# Patient Record
Sex: FEMALE | Race: WHITE | HISPANIC OR LATINO | URBAN - METROPOLITAN AREA
[De-identification: names, ages, dates, MRNs, and addresses within clinical notes are randomized per-mention and may not be internally consistent; named-entity substitution may affect disease eponyms.]

---

## 2020-06-10 ENCOUNTER — OFFICE VISIT (OUTPATIENT)
Dept: URBAN - METROPOLITAN AREA SURGERY CENTER 12 | Facility: SURGERY CENTER | Age: 55
End: 2020-06-10

## 2020-06-11 ENCOUNTER — LAB OUTSIDE AN ENCOUNTER (OUTPATIENT)
Dept: URBAN - METROPOLITAN AREA CLINIC 68 | Facility: CLINIC | Age: 55
End: 2020-06-11

## 2020-06-11 LAB — 01: (no result)

## 2020-06-18 ENCOUNTER — TELEPHONE ENCOUNTER (OUTPATIENT)
Dept: URBAN - METROPOLITAN AREA CLINIC 68 | Facility: CLINIC | Age: 55
End: 2020-06-18

## 2022-06-04 ENCOUNTER — TELEPHONE ENCOUNTER (OUTPATIENT)
Dept: URBAN - METROPOLITAN AREA CLINIC 68 | Facility: CLINIC | Age: 57
End: 2022-06-04

## 2022-06-04 RX ORDER — SODIUM SULFATE, POTASSIUM SULFATE, MAGNESIUM SULFATE 17.5; 3.13; 1.6 G/ML; G/ML; G/ML
SOLUTION, CONCENTRATE ORAL AS DIRECTED
Qty: 1 | Refills: 0 | OUTPATIENT
Start: 2017-11-09 | End: 2017-11-10

## 2022-06-04 RX ORDER — POLYETHYLENE GLYCOL 3350, SODIUM SULFATE ANHYDROUS, SODIUM BICARBONATE, SODIUM CHLORIDE, POTASSIUM CHLORIDE 236; 22.74; 6.74; 5.86; 2.97 G/4L; G/4L; G/4L; G/4L; G/4L
POWDER, FOR SOLUTION ORAL
Qty: 1 | Refills: 0 | OUTPATIENT
Start: 2020-02-27 | End: 2020-02-28

## 2022-06-05 ENCOUNTER — TELEPHONE ENCOUNTER (OUTPATIENT)
Dept: URBAN - METROPOLITAN AREA CLINIC 68 | Facility: CLINIC | Age: 57
End: 2022-06-05

## 2022-06-05 RX ORDER — SODIUM SULFATE, POTASSIUM SULFATE, MAGNESIUM SULFATE 17.5; 3.13; 1.6 G/ML; G/ML; G/ML
SOLUTION, CONCENTRATE ORAL AS DIRECTED
Qty: 1 | Refills: 0 | Status: ACTIVE | COMMUNITY
Start: 2020-05-19

## 2022-06-05 RX ORDER — DIMETHYL FUMARATE 240 MG/1
TECFIDERA( 240MG ORAL 1 TWO TIMES DAILY ) ACTIVE -HX ENTRY CAPSULE ORAL
Status: ACTIVE | COMMUNITY
Start: 2020-05-19

## 2022-06-10 ENCOUNTER — OFFICE VISIT (OUTPATIENT)
Dept: URBAN - METROPOLITAN AREA CLINIC 68 | Facility: CLINIC | Age: 57
End: 2022-06-10

## 2022-06-25 ENCOUNTER — TELEPHONE ENCOUNTER (OUTPATIENT)
Age: 57
End: 2022-06-25

## 2022-06-25 RX ORDER — SODIUM SULFATE, POTASSIUM SULFATE, MAGNESIUM SULFATE 17.5; 3.13; 1.6 G/ML; G/ML; G/ML
SOLUTION, CONCENTRATE ORAL AS DIRECTED
Qty: 1 | Refills: 0 | OUTPATIENT
Start: 2017-11-09 | End: 2017-11-10

## 2022-06-25 RX ORDER — POLYETHYLENE GLYCOL 3350, SODIUM SULFATE ANHYDROUS, SODIUM BICARBONATE, SODIUM CHLORIDE, POTASSIUM CHLORIDE 236; 22.74; 6.74; 5.86; 2.97 G/4L; G/4L; G/4L; G/4L; G/4L
POWDER, FOR SOLUTION ORAL
Qty: 1 | Refills: 0 | OUTPATIENT
Start: 2020-02-27 | End: 2020-02-28

## 2022-06-26 ENCOUNTER — TELEPHONE ENCOUNTER (OUTPATIENT)
Age: 57
End: 2022-06-26

## 2022-06-26 RX ORDER — DIMETHYL FUMARATE 240 MG/1
TECFIDERA( 240MG ORAL 1 TWO TIMES DAILY ) ACTIVE -HX ENTRY CAPSULE ORAL
Status: ACTIVE | COMMUNITY
Start: 2020-05-19

## 2022-06-26 RX ORDER — SODIUM SULFATE, POTASSIUM SULFATE, MAGNESIUM SULFATE 17.5; 3.13; 1.6 G/ML; G/ML; G/ML
SOLUTION, CONCENTRATE ORAL AS DIRECTED
Qty: 1 | Refills: 0 | Status: ACTIVE | COMMUNITY
Start: 2020-05-19

## 2022-07-11 ENCOUNTER — OFFICE VISIT (OUTPATIENT)
Dept: URBAN - METROPOLITAN AREA CLINIC 68 | Facility: CLINIC | Age: 57
End: 2022-07-11

## 2022-07-11 RX ORDER — SOD SULF/POT CHLORIDE/MAG SULF 1.479 G
AS DIRECTED TABLET ORAL ONCE
Qty: 1 PACKET | Refills: 0 | OUTPATIENT
Start: 2022-07-11

## 2022-07-11 RX ORDER — SODIUM SULFATE, POTASSIUM SULFATE, MAGNESIUM SULFATE 17.5; 3.13; 1.6 G/ML; G/ML; G/ML
SOLUTION, CONCENTRATE ORAL AS DIRECTED
Qty: 1 | Refills: 0 | Status: ON HOLD | COMMUNITY
Start: 2020-05-19

## 2022-07-11 RX ORDER — DIMETHYL FUMARATE 240 MG/1
TECFIDERA( 240MG ORAL 1 TWO TIMES DAILY ) ACTIVE -HX ENTRY CAPSULE ORAL
Status: ACTIVE | COMMUNITY
Start: 2020-05-19

## 2022-07-11 NOTE — EXAM-MIGRATED EXAMINATIONS
General Examination: General appearance: -> alert, pleasant, well-nourished and in no acute distress , alert, pleasant, well-nourished and in no acute distress   Head: -> normocephalic, atraumatic , normocephalic, atraumatic   Eyes: -> pupils equal, round, reactive to light and accommodation, sclera anicteric , pupils equal, round, reactive to light and accommodation, sclera anicteric   Ears: -> normal , normal   Oral cavity: -> mucosa moist , mucosa moist   Neck / thyroid: ->    Rectal: -> not examined  , not examined    Extremities: -> normal extremity with no clubbing, cyanosis or edema , normal extremity with no clubbing, cyanosis or edema   Neurologic: -> alert and oriented, normal exam with no motor or sensory deficits , alert and oriented, normal exam with no motor or sensory deficits   Lymph nodes: ->    Skin: -> skin is warm and dry, with no rashes, good skin turgor and normal hair distribution, with no suspicious skin lesions , skin is warm and dry, with no rashes, good skin turgor and normal hair distribution, with no suspicious skin lesions   Heart: -> regular rate and rhythm without murmurs, gallops, clicks or rubs , regular rate and rhythm without murmurs, gallops, clicks or rubs   Lungs: -> clear to auscultation bilaterally, with good air movement and no rales, rhonchi or wheezes , clear to auscultation bilaterally, with good air movement and no rales, rhonchi or wheezes   Breasts: ->    Abdomen: -> soft with good bowel sounds, nontender, and no masses or hepatosplenomegaly , soft with good bowel sounds, nontender, and no masses or hepatosplenomegaly

## 2022-07-11 NOTE — HPI-MIGRATED HPI
Transition of Care : Patient evaluated due to hx of colon polyps. Last colonoscopy was on 2020  and follow up was recommended in2  yrs. Referred  to be asymptomatic. Denies nausea, vomits, dysphagia, odynophagia, heartburn,  abdominal pain, diarrhea, constipation gi bleeding or weight loss

## 2022-08-08 ENCOUNTER — WEB ENCOUNTER (OUTPATIENT)
Dept: URBAN - METROPOLITAN AREA CLINIC 68 | Facility: CLINIC | Age: 57
End: 2022-08-08

## 2022-08-09 ENCOUNTER — WEB ENCOUNTER (OUTPATIENT)
Dept: URBAN - METROPOLITAN AREA CLINIC 68 | Facility: CLINIC | Age: 57
End: 2022-08-09

## 2022-08-10 ENCOUNTER — OFFICE VISIT (OUTPATIENT)
Dept: URBAN - METROPOLITAN AREA SURGERY CENTER 12 | Facility: SURGERY CENTER | Age: 57
End: 2022-08-10

## 2022-08-10 RX ORDER — SOD SULF/POT CHLORIDE/MAG SULF 1.479 G
AS DIRECTED TABLET ORAL ONCE
Qty: 1 PACKET | Refills: 0 | Status: ACTIVE | COMMUNITY
Start: 2022-07-11

## 2022-08-10 RX ORDER — DIMETHYL FUMARATE 240 MG/1
TECFIDERA( 240MG ORAL 1 TWO TIMES DAILY ) ACTIVE -HX ENTRY CAPSULE ORAL
Status: ACTIVE | COMMUNITY
Start: 2020-05-19

## 2022-08-10 RX ORDER — SODIUM SULFATE, POTASSIUM SULFATE, MAGNESIUM SULFATE 17.5; 3.13; 1.6 G/ML; G/ML; G/ML
SOLUTION, CONCENTRATE ORAL AS DIRECTED
Qty: 1 | Refills: 0 | Status: ON HOLD | COMMUNITY
Start: 2020-05-19

## 2022-08-15 ENCOUNTER — TELEPHONE ENCOUNTER (OUTPATIENT)
Dept: URBAN - METROPOLITAN AREA CLINIC 68 | Facility: CLINIC | Age: 57
End: 2022-08-15

## 2023-02-10 ENCOUNTER — OFFICE VISIT (OUTPATIENT)
Dept: URBAN - METROPOLITAN AREA CLINIC 68 | Facility: CLINIC | Age: 58
End: 2023-02-10

## 2023-02-10 RX ORDER — DIMETHYL FUMARATE 240 MG/1
TECFIDERA( 240MG ORAL 1 TWO TIMES DAILY ) ACTIVE -HX ENTRY CAPSULE ORAL
Status: ACTIVE | COMMUNITY
Start: 2020-05-19

## 2023-02-10 RX ORDER — SODIUM SULFATE, POTASSIUM SULFATE, MAGNESIUM SULFATE 17.5; 3.13; 1.6 G/ML; G/ML; G/ML
SOLUTION, CONCENTRATE ORAL AS DIRECTED
Qty: 1 | Refills: 0 | Status: ON HOLD | COMMUNITY
Start: 2020-05-19

## 2023-02-10 RX ORDER — SOD SULF/POT CHLORIDE/MAG SULF 1.479 G
AS DIRECTED TABLET ORAL ONCE
Qty: 1 PACKET | Refills: 0 | Status: ON HOLD | COMMUNITY
Start: 2022-07-11

## 2023-02-10 RX ORDER — SOD SULF/POT CHLORIDE/MAG SULF 1.479 G
AS DIRECTED TABLET ORAL ONCE
Qty: 1 PACKET | Refills: 0 | OUTPATIENT
Start: 2023-02-10

## 2023-02-10 NOTE — EXAM-MIGRATED EXAMINATIONS
General Examination: Rectal: -> not examined  , not examined    Extremities: -> normal extremity with no clubbing, cyanosis or edema , normal extremity with no clubbing, cyanosis or edema   Neurologic: -> alert and oriented, normal exam with no motor or sensory deficits , alert and oriented, normal exam with no motor or sensory deficits   Lymph nodes: ->    Skin: -> skin is warm and dry, with no rashes, good skin turgor and normal hair distribution, with no suspicious skin lesions , skin is warm and dry, with no rashes, good skin turgor and normal hair distribution, with no suspicious skin lesions   Heart: -> regular rate and rhythm without murmurs, gallops, clicks or rubs , regular rate and rhythm without murmurs, gallops, clicks or rubs   Lungs: -> clear to auscultation bilaterally, with good air movement and no rales, rhonchi or wheezes , clear to auscultation bilaterally, with good air movement and no rales, rhonchi or wheezes   Breasts: ->    Abdomen: -> soft with good bowel sounds, nontender, and no masses or hepatosplenomegaly , soft with good bowel sounds, nontender, and no masses or hepatosplenomegaly   General appearance: -> alert, pleasant, well-nourished and in no acute distress , alert, pleasant, well-nourished and in no acute distress   Head: -> normocephalic, atraumatic , normocephalic, atraumatic   Eyes: -> pupils equal, round, reactive to light and accommodation, sclera anicteric , pupils equal, round, reactive to light and accommodation, sclera anicteric   Ears: -> normal , normal   Oral cavity: -> mucosa moist , mucosa moist   Neck / thyroid: ->

## 2023-02-10 NOTE — HPI-MIGRATED HPI
Transition of Care : Patient evaluated due to large adenomatous polyp that needs f/u colonoscopy for polypectomy site re-evaluation.  Denies nausea, vomits, dysphagia, odynophagia, heartburn, abdominal pain, diarrhea, constipation GI bleeding or weight loss

## 2023-03-29 ENCOUNTER — OFFICE VISIT (OUTPATIENT)
Dept: URBAN - METROPOLITAN AREA SURGERY CENTER 12 | Facility: SURGERY CENTER | Age: 58
End: 2023-03-29

## 2023-03-29 RX ORDER — SOD SULF/POT CHLORIDE/MAG SULF 1.479 G
AS DIRECTED TABLET ORAL ONCE
Qty: 1 PACKET | Refills: 0 | Status: ON HOLD | COMMUNITY
Start: 2022-07-11

## 2023-03-29 RX ORDER — DIMETHYL FUMARATE 240 MG/1
TECFIDERA( 240MG ORAL 1 TWO TIMES DAILY ) ACTIVE -HX ENTRY CAPSULE ORAL
Status: ACTIVE | COMMUNITY
Start: 2020-05-19

## 2023-03-29 RX ORDER — SODIUM SULFATE, POTASSIUM SULFATE, MAGNESIUM SULFATE 17.5; 3.13; 1.6 G/ML; G/ML; G/ML
SOLUTION, CONCENTRATE ORAL AS DIRECTED
Qty: 1 | Refills: 0 | Status: ON HOLD | COMMUNITY
Start: 2020-05-19

## 2023-03-29 RX ORDER — SOD SULF/POT CHLORIDE/MAG SULF 1.479 G
AS DIRECTED TABLET ORAL ONCE
Qty: 1 PACKET | Refills: 0 | Status: ACTIVE | COMMUNITY
Start: 2023-02-10

## 2023-04-12 ENCOUNTER — OFFICE VISIT (OUTPATIENT)
Dept: URBAN - METROPOLITAN AREA CLINIC 68 | Facility: CLINIC | Age: 58
End: 2023-04-12

## 2023-04-12 ENCOUNTER — TELEPHONE ENCOUNTER (OUTPATIENT)
Dept: URBAN - METROPOLITAN AREA CLINIC 68 | Facility: CLINIC | Age: 58
End: 2023-04-12

## 2023-04-12 RX ORDER — SOD SULF/POT CHLORIDE/MAG SULF 1.479 G
AS DIRECTED TABLET ORAL ONCE
Qty: 1 PACKET | Refills: 0 | Status: ON HOLD | COMMUNITY
Start: 2022-07-11

## 2023-04-12 RX ORDER — DIMETHYL FUMARATE 240 MG/1
TECFIDERA( 240MG ORAL 1 TWO TIMES DAILY ) ACTIVE -HX ENTRY CAPSULE ORAL
Status: ACTIVE | COMMUNITY
Start: 2020-05-19

## 2023-04-12 RX ORDER — SODIUM SULFATE, POTASSIUM SULFATE, MAGNESIUM SULFATE 17.5; 3.13; 1.6 G/ML; G/ML; G/ML
SOLUTION, CONCENTRATE ORAL AS DIRECTED
Qty: 1 | Refills: 0 | Status: ON HOLD | COMMUNITY
Start: 2020-05-19

## 2023-04-12 RX ORDER — SOD SULF/POT CHLORIDE/MAG SULF 1.479 G
AS DIRECTED TABLET ORAL ONCE
Qty: 1 PACKET | Refills: 0 | Status: ON HOLD | COMMUNITY
Start: 2023-02-10

## 2023-04-12 NOTE — HPI-MIGRATED HPI
General : Patient evaluated due to hemorroids. 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  Patient consented for video-audio encounter using [employer] Complaint callie. Referred intermittent mild, painless bleeding from hemorroids.  Denies nausea, vomits, dysphagia, odynophagia, heartburn, abdominal pain, diarrhea, constipation GI or weight loss

## 2023-04-12 NOTE — EXAM-MIGRATED EXAMINATIONS
General Examination: General appearance: -> alert, pleasant, well-nourished and in no acute distress   Head: -> normocephalic, atraumatic   Eyes: -> pupils equal, round, reactive to light and accommodation, sclera anicteric   Ears: -> normal   Breasts: ->    Abdomen: -> soft with good bowel sounds, nontender, and no masses or hepatosplenomegaly   Rectal: -> not examined    Extremities: -> normal extremity with no clubbing, cyanosis or edema   Neurologic: -> alert and oriented, normal exam with no motor or sensory deficits   Oral cavity: -> mucosa moist   Neck / thyroid: ->    Lymph nodes: ->    Skin: -> skin is warm and dry, with no rashes, good skin turgor and normal hair distribution, with no suspicious skin lesions   Heart: -> regular rate and rhythm without murmurs, gallops, clicks or rubs   Lungs: -> clear to auscultation bilaterally, with good air movement and no rales, rhonchi or wheezes

## 2024-03-18 ENCOUNTER — OV EP (OUTPATIENT)
Dept: URBAN - METROPOLITAN AREA CLINIC 68 | Facility: CLINIC | Age: 59
End: 2024-03-18
Payer: COMMERCIAL

## 2024-03-18 VITALS
HEIGHT: 65 IN | BODY MASS INDEX: 18.66 KG/M2 | SYSTOLIC BLOOD PRESSURE: 116 MMHG | DIASTOLIC BLOOD PRESSURE: 64 MMHG | WEIGHT: 112 LBS

## 2024-03-18 DIAGNOSIS — K59.09 CHRONIC CONSTIPATION: ICD-10-CM

## 2024-03-18 DIAGNOSIS — D12.6 ADENOMATOUS POLYP OF COLON, UNSPECIFIED PART OF COLON: ICD-10-CM

## 2024-03-18 PROCEDURE — 99213 OFFICE O/P EST LOW 20 MIN: CPT | Performed by: INTERNAL MEDICINE

## 2024-03-18 RX ORDER — SODIUM SULFATE, POTASSIUM SULFATE, MAGNESIUM SULFATE 17.5; 3.13; 1.6 G/ML; G/ML; G/ML
SOLUTION, CONCENTRATE ORAL AS DIRECTED
Qty: 1 | Refills: 0 | Status: ON HOLD | COMMUNITY
Start: 2020-05-19

## 2024-03-18 RX ORDER — SOD SULF/POT CHLORIDE/MAG SULF 1.479 G
AS DIRECTED TABLET ORAL ONCE
Qty: 1 PACKET | Refills: 0 | Status: ON HOLD | COMMUNITY
Start: 2022-07-11

## 2024-03-18 RX ORDER — DIMETHYL FUMARATE 240 MG/1
TECFIDERA( 240MG ORAL 1 TWO TIMES DAILY ) ACTIVE -HX ENTRY CAPSULE ORAL
Status: ACTIVE | COMMUNITY
Start: 2020-05-19

## 2024-03-18 RX ORDER — SOD SULF/POT CHLORIDE/MAG SULF 1.479 G
12 TABLETS TABLET ORAL
Qty: 24 | Refills: 0 | OUTPATIENT
Start: 2024-03-18 | End: 2024-03-19

## 2024-03-18 RX ORDER — SOD SULF/POT CHLORIDE/MAG SULF 1.479 G
AS DIRECTED TABLET ORAL ONCE
Qty: 1 PACKET | Refills: 0 | Status: ON HOLD | COMMUNITY
Start: 2023-02-10

## 2024-03-18 NOTE — HPI-TODAY'S VISIT:
Patient evaluated due to hx of large adenomatous colon polypectomy in need of polypectomy site re-evaluation/surveillance.  Denies nausea, vomits, dysphagia, odynophagia, heartburn, abdominal pain, diarrhea, constipation GI bleeding or weight loss

## 2024-03-25 ENCOUNTER — LAB (OUTPATIENT)
Dept: URBAN - METROPOLITAN AREA CLINIC 68 | Facility: CLINIC | Age: 59
End: 2024-03-25

## 2024-04-22 ENCOUNTER — COLON (OUTPATIENT)
Dept: URBAN - METROPOLITAN AREA SURGERY CENTER 12 | Facility: SURGERY CENTER | Age: 59
End: 2024-04-22
Payer: COMMERCIAL

## 2024-04-22 ENCOUNTER — LAB (OUTPATIENT)
Dept: URBAN - METROPOLITAN AREA CLINIC 4 | Facility: CLINIC | Age: 59
End: 2024-04-22
Payer: COMMERCIAL

## 2024-04-22 DIAGNOSIS — D12.3 BENIGN NEOPLASM OF TRANSVERSE COLON: ICD-10-CM

## 2024-04-22 DIAGNOSIS — K62.3 RECTAL PROLAPSE: ICD-10-CM

## 2024-04-22 DIAGNOSIS — K63.5 POLYP OF SIGMOID COLON, UNSPECIFIED TYPE: ICD-10-CM

## 2024-04-22 DIAGNOSIS — D12.0 BENIGN NEOPLASM OF CECUM: ICD-10-CM

## 2024-04-22 DIAGNOSIS — K63.89 OTHER SPECIFIED DISEASES OF INTESTINE: ICD-10-CM

## 2024-04-22 DIAGNOSIS — K64.8 OTHER HEMORRHOIDS: ICD-10-CM

## 2024-04-22 PROCEDURE — 88305 TISSUE EXAM BY PATHOLOGIST: CPT | Performed by: PATHOLOGY

## 2024-04-22 PROCEDURE — 45385 COLONOSCOPY W/LESION REMOVAL: CPT | Performed by: INTERNAL MEDICINE

## 2024-04-22 RX ORDER — DIMETHYL FUMARATE 240 MG/1
TECFIDERA( 240MG ORAL 1 TWO TIMES DAILY ) ACTIVE -HX ENTRY CAPSULE ORAL
Status: ACTIVE | COMMUNITY
Start: 2020-05-19

## 2024-04-22 RX ORDER — SODIUM SULFATE, POTASSIUM SULFATE, MAGNESIUM SULFATE 17.5; 3.13; 1.6 G/ML; G/ML; G/ML
SOLUTION, CONCENTRATE ORAL AS DIRECTED
Qty: 1 | Refills: 0 | Status: ON HOLD | COMMUNITY
Start: 2020-05-19

## 2024-04-22 RX ORDER — SOD SULF/POT CHLORIDE/MAG SULF 1.479 G
AS DIRECTED TABLET ORAL ONCE
Qty: 1 PACKET | Refills: 0 | Status: ON HOLD | COMMUNITY
Start: 2023-02-10

## 2024-04-22 RX ORDER — SOD SULF/POT CHLORIDE/MAG SULF 1.479 G
AS DIRECTED TABLET ORAL ONCE
Qty: 1 PACKET | Refills: 0 | Status: ON HOLD | COMMUNITY
Start: 2022-07-11